# Patient Record
(demographics unavailable — no encounter records)

---

## 2025-03-08 NOTE — ADDENDUM
[FreeTextEntry1] : Entered by Hernesto Dailey, acting as scribe for Dr. Austen Dhillon. The documentation recorded by the scribe accurately reflects the service I personally performed and the decisions made by me.

## 2025-03-08 NOTE — LETTER BODY
[FreeTextEntry1] : Hung Brown -[address], [place] [address] [phone] (W) [phone] (F)  Dear Dr. Brown,    Reason for Visit: BPH. Elevated PSA.   This is a 77 year-old male with elevated PSA and symptoms of BPH. Patient is here today for evaluation. Patient was last seen 4 years ago. Patient was previously prescribed Flomax BID and Proscar but discontinued the medication. He is currently not on medical therapy. Patient reports he has weak uroflow, frequency, and hesitancy. He denies any hematuria or urinary incontinence. His symptoms are aggravated by hydration. He denies any alleviating factors. He has not tried any medical therapy previously. He reports no pain. His recent PSA increased from 2 to 3.5. Patient reports he has not had a previous prostate biopsy. All other review of systems are negative. He has no cancer in his family medical history. He has no previous surgical history. Past medical history, family history and social history were inquired and were noncontributory to current condition. The patient does not use tobacco or drink alcohol. Medications and allergies were reviewed. He has no known allergies to medication.   On examination, the patient is a well -appearing male in no acute distress. He is alert and oriented follows commands. He has normal mood and affect. He is normocephalic. Neck is supple. Oral no thrush Respirations are unlabored. His abdomen is soft and nontender. Bladder is nonpalpable. No CVA tenderness. Neurologically he is grossly intact. No peripheral edema. Skin without gross abnormality.    His PSA increased to 3.5. His previous PSA was 2.    Post-void residual on bladder scan today was 57 cc.   ASSESSMENT: BPH. Elevated PSA.   I counseled the patient on the various etiology of his symptoms. I discussed the natural history of BPH and the treatment options available. I discussed the options of conservative management with fluid in dietary restrictions, herbal therapy, medical therapy, and minimally invasive procedures. His PVR today was 57 cc. I recommended he try Flomax. I discussed the potential side effects of the medication. I counseled the patient on its use and side effects. If the patient develops any side effects, the patient will discontinue the medication and contact me. In terms of his elevated PSA, I discussed with him the risk of occult malignancy. I discussed the various etiologies of PSA elevation, including enlargement of prostate, inflammation or infection, and prostate cancer. I counseled the patient. I discussed with him the risk of occult malignancy. I recommended he obtain a prostate MRI for fusion targeted prostate biopsy.  Risks and alternatives were discussed. I answered the patient questions. He will take the necessary preparations for the procedure, including fleet enema. He will repeat PSA and BMP today to establish baselines. Risks and alternatives were discussed. I answered the patient's questions. The patient will follow-up as directed and will contact me with any questions or concerns. Thank you for the opportunity to participate in the care of Mr. DAWN. I will keep you updated on his progress.   Plan: Trial of Flomax. PSA. BMP. Prostate MRI. Consider fusion biopsy. Follow up in 1 month.

## 2025-03-08 NOTE — LETTER BODY
[FreeTextEntry1] : Hung Brown -[address], [place] [address] [phone] (W) [phone] (F)  Dear Dr. Brown,    Reason for Visit: BPH. Elevated PSA.   This is a 77 year-old male with elevated PSA and symptoms of BPH. Patient is here today for evaluation. Patient was last seen 4 years ago. Patient was previously prescribed Flomax BID and Proscar but discontinued the medication. He is currently not on medical therapy. Patient reports he has weak uroflow, frequency, and hesitancy. He denies any hematuria or urinary incontinence. His symptoms are aggravated by hydration. He denies any alleviating factors. He has not tried any medical therapy previously. He reports no pain. His recent PSA increased from 2 to 3.5. Patient reports he has not had a previous prostate biopsy. All other review of systems are negative. He has no cancer in his family medical history. He has no previous surgical history. Past medical history, family history and social history were inquired and were noncontributory to current condition. The patient does not use tobacco or drink alcohol. Medications and allergies were reviewed. He has no known allergies to medication.   On examination, the patient is a well -appearing male in no acute distress. He is alert and oriented follows commands. He has normal mood and affect. He is normocephalic. Neck is supple. Oral no thrush Respirations are unlabored. His abdomen is soft and nontender. Bladder is nonpalpable. No CVA tenderness. Neurologically he is grossly intact. No peripheral edema. Skin without gross abnormality.    His PSA increased to 3.5. His previous PSA was 2.    Post-void residual on bladder scan today was 57 cc.   ASSESSMENT: BPH. Elevated PSA.   I counseled the patient on the various etiology of his symptoms. I discussed the natural history of BPH and the treatment options available. I discussed the options of conservative management with fluid in dietary restrictions, herbal therapy, medical therapy, and minimally invasive procedures. His PVR today was 57 cc. I recommended he try Flomax. I discussed the potential side effects of the medication. I counseled the patient on its use and side effects. If the patient develops any side effects, the patient will discontinue the medication and contact me. In terms of his elevated PSA, I discussed with him the risk of occult malignancy. I discussed the various etiologies of PSA elevation, including enlargement of prostate, inflammation or infection, and prostate cancer. I counseled the patient. I discussed with him the risk of occult malignancy. I recommended he obtain a prostate MRI for fusion targeted prostate biopsy.  Risks and alternatives were discussed. I answered the patient questions. He will take the necessary preparations for the procedure, including fleet enema. He will repeat PSA and BMP today to establish baselines. Risks and alternatives were discussed. I answered the patient's questions. The patient will follow-up as directed and will contact me with any questions or concerns. Thank you for the opportunity to participate in the care of Mr. DAWN. I will keep you updated on his progress.   Plan: Trial of Flomax. PSA. BMP. Prostate MRI. Consider fusion biopsy. Follow up in 1 month.

## 2025-03-08 NOTE — HISTORY OF PRESENT ILLNESS
[FreeTextEntry1] : Patient was last seen in 2021 for BPH, was taking Flomax BID and Proscar that patient stopped due to discomfort. Reports weak urine stream, frequency during the day, nocturia for 5-6 times per night. PVR 57ml today  PSA 3.04in Jan 2025, patient concerning elevated PSA, unable to tell if he is taking Proscar.    Please refer to URO Consult Note.